# Patient Record
Sex: MALE | Race: OTHER | NOT HISPANIC OR LATINO | URBAN - METROPOLITAN AREA
[De-identification: names, ages, dates, MRNs, and addresses within clinical notes are randomized per-mention and may not be internally consistent; named-entity substitution may affect disease eponyms.]

---

## 2019-05-21 ENCOUNTER — EMERGENCY (EMERGENCY)
Facility: HOSPITAL | Age: 54
LOS: 1 days | Discharge: HOME | End: 2019-05-21
Attending: EMERGENCY MEDICINE | Admitting: EMERGENCY MEDICINE
Payer: MEDICAID

## 2019-05-21 VITALS
OXYGEN SATURATION: 100 % | DIASTOLIC BLOOD PRESSURE: 87 MMHG | SYSTOLIC BLOOD PRESSURE: 139 MMHG | HEART RATE: 87 BPM | RESPIRATION RATE: 18 BRPM | TEMPERATURE: 99 F

## 2019-05-21 PROCEDURE — 70486 CT MAXILLOFACIAL W/O DYE: CPT | Mod: 26

## 2019-05-21 PROCEDURE — 99284 EMERGENCY DEPT VISIT MOD MDM: CPT | Mod: 25

## 2019-05-21 PROCEDURE — 93010 ELECTROCARDIOGRAM REPORT: CPT

## 2019-05-21 PROCEDURE — 70450 CT HEAD/BRAIN W/O DYE: CPT | Mod: 26

## 2019-05-21 NOTE — ED PROVIDER NOTE - PHYSICAL EXAMINATION
GENERAL: well developed, NAD  HEAD:  trauma over right side of face w/ hematoma over right eye- able to close and open eyelid, and hematoma over right maxilla and right upper lip withou laceration appreciated, Normocephalic  EYES: EOMI, PERRLA, conjunctiva and sclera clear  Mouth: MMM, no lesions  NECK: Supple, no appreciable masses,, no pain to palpation of cervical spine  Lung: normal work of breathing, cta b/l  Chest: S1&S2+, rrr, no m/r/g appreciated  ABDOMEN: bs+, soft, nt, nd, no appreciable masses  : No simpson catheter, no CVA tenderness  EXTREMITIES:  radial pulse present b/l, PT present b/l, no pitting edema present  Neuro: A&Ox to name and location but could not remember the year, cannot remember event leading to fall or immediately after fall,  CN II-XII intact, no focal weakness in upper or lower extremities, finger to nose intact b/l, hyperreflexic on left biceps 3+ and left pattellar 3+ and noted upward babinski on left  SKIN: warm and dry, hematomas over face with ecchymoses, right arm w/ burn from fall

## 2019-05-21 NOTE — ED ADULT TRIAGE NOTE - CHIEF COMPLAINT QUOTE
s/p fall while playing basket ball around 6:30pm.  Pt presents with facial abrasions, forehead hematoma , right gustavo orbital swelling. Pt hit his head and as per family pt is forgetful after the fall. Pt was seen at [facility]  and was given Tdap and was told to come to ER for CT scan. Pt unable to recall day or date in triage. c/o pain to face . Pt denies LOC.

## 2019-05-21 NOTE — ED ADULT NURSE NOTE - CHIEF COMPLAINT QUOTE
s/p fall while playing basket ball around 6:30pm.  Pt presents with facial abrasions, forehead hematoma , right gustavo orbital swelling. Pt hit his head and as per family pt is forgetful after the fall. Pt was seen at [facility]  and was given Tdap and was told to come to ER for CT scan. Pt unable to recall day or date in triage. c/o pain to face . Pt denies LOC.

## 2019-05-21 NOTE — ED ADULT NURSE NOTE - OBJECTIVE STATEMENT
Pt received in room 15, AA&OX3 forgetful about events of today. Pt reports playing basketball at around 7PM and falling while playing, no syncopal episode or LOC. Pt went to urgent care at 9PM, received TDap shot and was sent to ED for CT Scan. Pt received in room 15, AA&OX3 forgetful about events of today. Pt reports playing basketball at around 7PM and reports tripping and falling while playing, no syncopal episode or LOC. Pt went to urgent care at 9PM, received TDap shot and was sent to ED for CT Scan. Wife at bedside reports that pt was confused after event. Pt has abrasions to face, hematoma to forehead, abrasion to RT shoulder. Pt denies headache, dizziness, SOB, CP, nausea, vomiting, diarrhea, no vision changes, MD at bedside for evaluation. Pt given ice packs. Pt taken to CT. Will continue to monitor.

## 2019-05-21 NOTE — ED PROVIDER NOTE - ATTENDING CONTRIBUTION TO CARE
Attending note:   After face to face evaluation of this patient, I concur with above noted hx, pe, and care plan for this patient.  54 y/o M playing basketball with head and face injury from fall, confused at home, but not regaining answers to questions.   Able to answer birthdates, addresses without problem.  +Multiple facial abrasions.   Neuro nonfocal.   Evaluation in progress.   TDUTD today from [facility]

## 2019-05-21 NOTE — ED PROVIDER NOTE - OBJECTIVE STATEMENT
53M w/ no PMH presenting from urgent care after having confusion following fall and head trauma. Patient accompanied by wife who provide history as patient cannot remember events leading to event. 53M w/ no PMH presenting from urgent care after having confusion following fall and head trauma. Patient accompanied by wife who provides history as patient cannot remember events leading to event. Patient was playing basketball around 6:30-7pm and had reported fall when dribbling and hit right side of head. He did not have reported LOC, preceding cp/palpitations/sob prior to fall and was able to drive home. While home his family noticed that he became increasingly confused and therefore brought him to urgent care who then referred to ED for head ct. family member Jovan was able to provide more history over telephone informing that patient tried to do crossover and fell forward landing on head but did not have LOC at any point and confirmed above story. Patient does not remember events of fall. Patient denies neck pain, photophobia, focal weakness or paralysis, n/v.

## 2019-05-21 NOTE — ED PROVIDER NOTE - CLINICAL SUMMARY MEDICAL DECISION MAKING FREE TEXT BOX
Guerrero Lewis MD PGY3: 53M w/ no PMH presenting from urgent care after having confusion following fall and head trauma. CT head r/o hemorrhage. if neg c/w concussion syndrome and can likely dispo.

## 2019-05-22 VITALS — TEMPERATURE: 100 F

## 2025-06-15 PROBLEM — Z78.9 OTHER SPECIFIED HEALTH STATUS: Chronic | Status: ACTIVE | Noted: 2019-05-21

## 2025-06-30 ENCOUNTER — NON-APPOINTMENT (OUTPATIENT)
Age: 60
End: 2025-06-30

## 2025-06-30 PROBLEM — Z00.00 ENCOUNTER FOR PREVENTIVE HEALTH EXAMINATION: Status: ACTIVE | Noted: 2025-06-30

## 2025-07-02 ENCOUNTER — APPOINTMENT (OUTPATIENT)
Dept: ORTHOPEDIC SURGERY | Facility: CLINIC | Age: 60
End: 2025-07-02
Payer: MEDICAID

## 2025-07-02 VITALS
DIASTOLIC BLOOD PRESSURE: 86 MMHG | WEIGHT: 162 LBS | HEART RATE: 94 BPM | HEIGHT: 67 IN | BODY MASS INDEX: 25.43 KG/M2 | SYSTOLIC BLOOD PRESSURE: 122 MMHG | OXYGEN SATURATION: 98 % | RESPIRATION RATE: 16 BRPM

## 2025-07-02 PROCEDURE — 99204 OFFICE O/P NEW MOD 45 MIN: CPT

## 2025-07-10 ENCOUNTER — TRANSCRIPTION ENCOUNTER (OUTPATIENT)
Age: 60
End: 2025-07-10

## 2025-07-10 ENCOUNTER — APPOINTMENT (OUTPATIENT)
Dept: MRI IMAGING | Facility: CLINIC | Age: 60
End: 2025-07-10

## 2025-07-10 ENCOUNTER — OUTPATIENT (OUTPATIENT)
Dept: OUTPATIENT SERVICES | Facility: HOSPITAL | Age: 60
LOS: 1 days | End: 2025-07-10
Payer: MEDICAID

## 2025-07-10 DIAGNOSIS — M79.2 NEURALGIA AND NEURITIS, UNSPECIFIED: ICD-10-CM

## 2025-07-10 DIAGNOSIS — M54.50 LOW BACK PAIN, UNSPECIFIED: ICD-10-CM

## 2025-07-10 PROCEDURE — 72148 MRI LUMBAR SPINE W/O DYE: CPT | Mod: 26

## 2025-07-10 PROCEDURE — 72148 MRI LUMBAR SPINE W/O DYE: CPT

## 2025-09-03 ENCOUNTER — APPOINTMENT (OUTPATIENT)
Dept: ORTHOPEDIC SURGERY | Facility: CLINIC | Age: 60
End: 2025-09-03
Payer: MEDICAID

## 2025-09-03 ENCOUNTER — APPOINTMENT (OUTPATIENT)
Dept: ORTHOPEDIC SURGERY | Facility: CLINIC | Age: 60
End: 2025-09-03

## 2025-09-03 VITALS
HEART RATE: 57 BPM | WEIGHT: 161 LBS | RESPIRATION RATE: 16 BRPM | DIASTOLIC BLOOD PRESSURE: 74 MMHG | BODY MASS INDEX: 25.22 KG/M2 | SYSTOLIC BLOOD PRESSURE: 133 MMHG | OXYGEN SATURATION: 99 %

## 2025-09-03 PROCEDURE — 99214 OFFICE O/P EST MOD 30 MIN: CPT

## 2025-09-10 ENCOUNTER — APPOINTMENT (OUTPATIENT)
Dept: ORTHOPEDIC SURGERY | Facility: CLINIC | Age: 60
End: 2025-09-10
Payer: MEDICAID

## 2025-09-10 VITALS
OXYGEN SATURATION: 100 % | SYSTOLIC BLOOD PRESSURE: 133 MMHG | BODY MASS INDEX: 25.37 KG/M2 | DIASTOLIC BLOOD PRESSURE: 78 MMHG | WEIGHT: 162 LBS | HEART RATE: 60 BPM | RESPIRATION RATE: 16 BRPM

## 2025-09-10 DIAGNOSIS — M54.50 LOW BACK PAIN, UNSPECIFIED: ICD-10-CM

## 2025-09-10 DIAGNOSIS — M48.061 SPINAL STENOSIS, LUMBAR REGION WITHOUT NEUROGENIC CLAUDICATION: ICD-10-CM

## 2025-09-10 DIAGNOSIS — M79.2 NEURALGIA AND NEURITIS, UNSPECIFIED: ICD-10-CM

## 2025-09-10 PROCEDURE — 99214 OFFICE O/P EST MOD 30 MIN: CPT
